# Patient Record
Sex: MALE | Race: BLACK OR AFRICAN AMERICAN | Employment: PART TIME | URBAN - METROPOLITAN AREA
[De-identification: names, ages, dates, MRNs, and addresses within clinical notes are randomized per-mention and may not be internally consistent; named-entity substitution may affect disease eponyms.]

---

## 2019-04-18 ENCOUNTER — APPOINTMENT (OUTPATIENT)
Dept: CT IMAGING | Age: 64
End: 2019-04-18
Attending: PHYSICIAN ASSISTANT
Payer: COMMERCIAL

## 2019-04-18 ENCOUNTER — HOSPITAL ENCOUNTER (EMERGENCY)
Age: 64
Discharge: HOME | End: 2019-04-18
Attending: EMERGENCY MEDICINE
Payer: COMMERCIAL

## 2019-04-18 VITALS
SYSTOLIC BLOOD PRESSURE: 132 MMHG | WEIGHT: 120.3 LBS | OXYGEN SATURATION: 99 % | TEMPERATURE: 99.3 F | HEART RATE: 78 BPM | DIASTOLIC BLOOD PRESSURE: 71 MMHG | RESPIRATION RATE: 19 BRPM

## 2019-04-18 DIAGNOSIS — F11.93 METHADONE WITHDRAWAL (HCC): Primary | ICD-10-CM

## 2019-04-18 LAB
ALBUMIN SERPL-MCNC: 3.8 G/DL (ref 3.5–5)
ALBUMIN/GLOB SERPL: 0.8 {RATIO} (ref 1.1–2.2)
ALP SERPL-CCNC: 99 U/L (ref 45–117)
ALT SERPL-CCNC: 41 U/L (ref 12–78)
ANION GAP SERPL CALC-SCNC: 11 MMOL/L (ref 5–15)
AST SERPL-CCNC: 32 U/L (ref 15–37)
ATRIAL RATE: 63 BPM
BASOPHILS # BLD: 0 K/UL (ref 0–0.1)
BASOPHILS NFR BLD: 0 % (ref 0–1)
BILIRUB SERPL-MCNC: 0.5 MG/DL (ref 0.2–1)
BUN SERPL-MCNC: 19 MG/DL (ref 6–20)
BUN/CREAT SERPL: 19 (ref 12–20)
CALCIUM SERPL-MCNC: 9.2 MG/DL (ref 8.5–10.1)
CALCULATED P AXIS, ECG09: 75 DEGREES
CALCULATED R AXIS, ECG10: 6 DEGREES
CALCULATED T AXIS, ECG11: -12 DEGREES
CHLORIDE SERPL-SCNC: 103 MMOL/L (ref 97–108)
CK SERPL-CCNC: 204 U/L (ref 39–308)
CO2 SERPL-SCNC: 28 MMOL/L (ref 21–32)
CREAT SERPL-MCNC: 0.98 MG/DL (ref 0.7–1.3)
DIAGNOSIS, 93000: NORMAL
DIFFERENTIAL METHOD BLD: NORMAL
EOSINOPHIL # BLD: 0 K/UL (ref 0–0.4)
EOSINOPHIL NFR BLD: 0 % (ref 0–7)
ERYTHROCYTE [DISTWIDTH] IN BLOOD BY AUTOMATED COUNT: 13.3 % (ref 11.5–14.5)
FLUAV AG NPH QL IA: NEGATIVE
FLUBV AG NOSE QL IA: NEGATIVE
GLOBULIN SER CALC-MCNC: 4.5 G/DL (ref 2–4)
GLUCOSE SERPL-MCNC: 140 MG/DL (ref 65–100)
HCT VFR BLD AUTO: 43.6 % (ref 36.6–50.3)
HGB BLD-MCNC: 14.6 G/DL (ref 12.1–17)
IMM GRANULOCYTES # BLD AUTO: 0 K/UL (ref 0–0.04)
IMM GRANULOCYTES NFR BLD AUTO: 0 % (ref 0–0.5)
LIPASE SERPL-CCNC: 73 U/L (ref 73–393)
LYMPHOCYTES # BLD: 1.6 K/UL (ref 0.8–3.5)
LYMPHOCYTES NFR BLD: 21 % (ref 12–49)
MCH RBC QN AUTO: 30.2 PG (ref 26–34)
MCHC RBC AUTO-ENTMCNC: 33.5 G/DL (ref 30–36.5)
MCV RBC AUTO: 90.3 FL (ref 80–99)
MONOCYTES # BLD: 0.9 K/UL (ref 0–1)
MONOCYTES NFR BLD: 11 % (ref 5–13)
NEUTS SEG # BLD: 5.2 K/UL (ref 1.8–8)
NEUTS SEG NFR BLD: 68 % (ref 32–75)
NRBC # BLD: 0 K/UL (ref 0–0.01)
NRBC BLD-RTO: 0 PER 100 WBC
P-R INTERVAL, ECG05: 152 MS
PLATELET # BLD AUTO: 193 K/UL (ref 150–400)
PMV BLD AUTO: 10.9 FL (ref 8.9–12.9)
POTASSIUM SERPL-SCNC: 3.2 MMOL/L (ref 3.5–5.1)
PROT SERPL-MCNC: 8.3 G/DL (ref 6.4–8.2)
Q-T INTERVAL, ECG07: 486 MS
QRS DURATION, ECG06: 82 MS
QTC CALCULATION (BEZET), ECG08: 497 MS
RBC # BLD AUTO: 4.83 M/UL (ref 4.1–5.7)
SODIUM SERPL-SCNC: 142 MMOL/L (ref 136–145)
TROPONIN I SERPL-MCNC: <0.05 NG/ML
VENTRICULAR RATE, ECG03: 63 BPM
WBC # BLD AUTO: 7.7 K/UL (ref 4.1–11.1)

## 2019-04-18 PROCEDURE — 83690 ASSAY OF LIPASE: CPT

## 2019-04-18 PROCEDURE — 85025 COMPLETE CBC W/AUTO DIFF WBC: CPT

## 2019-04-18 PROCEDURE — 74011250636 HC RX REV CODE- 250/636: Performed by: PHYSICIAN ASSISTANT

## 2019-04-18 PROCEDURE — 93005 ELECTROCARDIOGRAM TRACING: CPT

## 2019-04-18 PROCEDURE — 80053 COMPREHEN METABOLIC PANEL: CPT

## 2019-04-18 PROCEDURE — 96374 THER/PROPH/DIAG INJ IV PUSH: CPT

## 2019-04-18 PROCEDURE — 36415 COLL VENOUS BLD VENIPUNCTURE: CPT

## 2019-04-18 PROCEDURE — 74011250637 HC RX REV CODE- 250/637: Performed by: PHYSICIAN ASSISTANT

## 2019-04-18 PROCEDURE — 96375 TX/PRO/DX INJ NEW DRUG ADDON: CPT

## 2019-04-18 PROCEDURE — 82550 ASSAY OF CK (CPK): CPT

## 2019-04-18 PROCEDURE — 74011636320 HC RX REV CODE- 636/320: Performed by: EMERGENCY MEDICINE

## 2019-04-18 PROCEDURE — 84484 ASSAY OF TROPONIN QUANT: CPT

## 2019-04-18 PROCEDURE — 96361 HYDRATE IV INFUSION ADD-ON: CPT

## 2019-04-18 PROCEDURE — 71275 CT ANGIOGRAPHY CHEST: CPT

## 2019-04-18 PROCEDURE — 87804 INFLUENZA ASSAY W/OPTIC: CPT

## 2019-04-18 PROCEDURE — 99285 EMERGENCY DEPT VISIT HI MDM: CPT

## 2019-04-18 RX ORDER — ONDANSETRON 2 MG/ML
4 INJECTION INTRAMUSCULAR; INTRAVENOUS
Status: COMPLETED | OUTPATIENT
Start: 2019-04-18 | End: 2019-04-18

## 2019-04-18 RX ORDER — SODIUM CHLORIDE 0.9 % (FLUSH) 0.9 %
5-10 SYRINGE (ML) INJECTION
Status: COMPLETED | OUTPATIENT
Start: 2019-04-18 | End: 2019-04-18

## 2019-04-18 RX ORDER — POTASSIUM CHLORIDE 1.5 G/1.77G
20 POWDER, FOR SOLUTION ORAL
Status: COMPLETED | OUTPATIENT
Start: 2019-04-18 | End: 2019-04-18

## 2019-04-18 RX ORDER — FAMOTIDINE 10 MG/ML
20 INJECTION INTRAVENOUS
Status: COMPLETED | OUTPATIENT
Start: 2019-04-18 | End: 2019-04-18

## 2019-04-18 RX ORDER — PROMETHAZINE HYDROCHLORIDE 25 MG/1
25 TABLET ORAL
Status: COMPLETED | OUTPATIENT
Start: 2019-04-18 | End: 2019-04-18

## 2019-04-18 RX ORDER — ONDANSETRON 4 MG/1
4 TABLET, ORALLY DISINTEGRATING ORAL
Qty: 8 TAB | Refills: 0 | Status: SHIPPED | OUTPATIENT
Start: 2019-04-18

## 2019-04-18 RX ORDER — DIAZEPAM 5 MG/1
5 TABLET ORAL
Status: COMPLETED | OUTPATIENT
Start: 2019-04-18 | End: 2019-04-18

## 2019-04-18 RX ORDER — SODIUM CHLORIDE 0.9 % (FLUSH) 0.9 %
5-40 SYRINGE (ML) INJECTION EVERY 8 HOURS
Status: DISCONTINUED | OUTPATIENT
Start: 2019-04-18 | End: 2019-04-18 | Stop reason: HOSPADM

## 2019-04-18 RX ORDER — SODIUM CHLORIDE 0.9 % (FLUSH) 0.9 %
5-40 SYRINGE (ML) INJECTION AS NEEDED
Status: DISCONTINUED | OUTPATIENT
Start: 2019-04-18 | End: 2019-04-18 | Stop reason: HOSPADM

## 2019-04-18 RX ADMIN — SODIUM CHLORIDE 1000 ML: 900 INJECTION, SOLUTION INTRAVENOUS at 12:34

## 2019-04-18 RX ADMIN — DIAZEPAM 5 MG: 5 TABLET ORAL at 16:12

## 2019-04-18 RX ADMIN — ONDANSETRON HYDROCHLORIDE 4 MG: 2 SOLUTION INTRAMUSCULAR; INTRAVENOUS at 12:38

## 2019-04-18 RX ADMIN — Medication 10 ML: at 14:44

## 2019-04-18 RX ADMIN — PROMETHAZINE HYDROCHLORIDE 25 MG: 25 TABLET ORAL at 16:12

## 2019-04-18 RX ADMIN — IOPAMIDOL 100 ML: 755 INJECTION, SOLUTION INTRAVENOUS at 14:44

## 2019-04-18 RX ADMIN — FAMOTIDINE 20 MG: 10 INJECTION INTRAVENOUS at 12:38

## 2019-04-18 RX ADMIN — POTASSIUM CHLORIDE 20 MEQ: 1.5 POWDER, FOR SOLUTION ORAL at 16:13

## 2019-04-18 NOTE — ED NOTES
Emergency Department Nursing Plan of Care The Nursing Plan of Care is developed from the Nursing assessment and Emergency Department Attending provider initial evaluation. The plan of care may be reviewed in the ED Provider note. The Plan of Care was developed with the following considerations:  
Patient / Family readiness to learn indicated by:verbalized understanding Persons(s) to be included in education: patient Barriers to Learning/Limitations:No 
 
Signed Jamar Velasquez RN   
4/18/2019   12:32 PM

## 2019-04-18 NOTE — ED TRIAGE NOTES
Patient presents via EMS with primary c/o weakness associated with missing several doses of methadone. Patient reports generalized body aches, nausea, hot/cold chills. PIV established en route. Patient received 500 ml NS, 4 mg Zofran en route. Patient pleasant but anxious at this time.

## 2019-04-18 NOTE — PROGRESS NOTES
Spiritual Care Partner Volunteer visited patient in [place] [address] at [facility] on 4/18/19. Documented by: 
Clifton Sousa

## 2019-04-18 NOTE — ED NOTES
Rand has been taking methadone for 3 years but has not been able to get dosed for 2-3 days. Has been having nausea with vomiting and small amount of diarrhea over last 2 days. Rand was offered heroin to ease symptoms but turned it down. Is very pleasant and cooperative.

## 2019-04-18 NOTE — DISCHARGE INSTRUCTIONS
Patient Education        Learning About Medication-Assisted Treatment for Opioid Use Disorder  What is opioid use disorder? Opioid use disorder means using opioid drugs without a prescription or using them differently from what your doctor prescribed. Opioids are strong pain medicines. Examples include:  · Hydrocodone. · Oxycodone. · Fentanyl. · Morphine. Heroin is an example of an illegal opioid. Opioid use can quickly become a habit. It can change your brain and how it works. If you keep using the drug, you may get strong cravings for it. You may need more and more of the drug to get the same effect. You may become addicted. What is medication-assisted treatment? Medication-assisted treatment, or MAT, involves taking a substitute drug in place of the opioid you were using. The substitute drugs used in MAT include:  · Buprenorphine, such as Subutex. This drug works by targeting the same places in the brain that opioids do. · Methadone, such as Dolophine. It stops you from getting the quick \"high\" of opioids. · Naltrexone, such as ReVia. It prevents the feelings of pleasure you get from taking the opioid. This drug is only given after you have gone through withdrawal.  MAT can work in different ways, depending on which medicine you take. Sometimes it can help with withdrawal symptoms. Other times it helps you manage cravings. When you use MAT, you no longer think all the time about how to get or use drugs. You are more able to focus on getting better. You can work on how you will stay away from using drugs. What is it like to take this medicine? Because these medicines are very powerful, doctors follow a strict set of rules for MAT. This is to make sure the medicines are used safely. Your doctor may have you sign a written agreement to take your medicine exactly as he or she tells you to. Yamileth Whipple also agree to be careful with it and not share it with others.  If you don't follow the agreement, your doctor may not be willing to keep treating you. Depending on the medicine being used, you will probably take a pill every day, at least at first. With some medicines, you may be able to take them less often after a while. You may have to go to the doctor's office or a clinic to get your medicine each time. Your dose may need to be adjusted up or down at first.  Some people have side effects, but they're usually minor. You might take the new drug for months, years, or even for life. Along with MAT, counseling and education are also an important part of treatment for opioid use disorder. Follow-up care is a key part of your treatment and safety. Be sure to make and go to all appointments, and call your doctor if you are having problems. It's also a good idea to know your test results and keep a list of the medicines you take. Where can you learn more? Go to http://vinita-amelia.info/. Enter U413 in the search box to learn more about \"Learning About Medication-Assisted Treatment for Opioid Use Disorder. \"  Current as of: May 7, 2018  Content Version: 11.9  © 0457-2029 [employer]. Care instructions adapted under license by [employer] (which disclaims liability or warranty for this information). If you have questions about a medical condition or this instruction, always ask your healthcare professional. [address] any warranty or liability for your use of this information.

## 2019-04-18 NOTE — ED NOTES
Pt given printed discharge instructions and 1 script(s). Pt verbalized understanding of instructions and script(s). Pt verbalized importance of following up with [facility] and [address]. Pt alert and oriented, in no acute distress, ambulatory with self.

## 2019-04-18 NOTE — PROGRESS NOTES
CM reviewed chart. Pt verified his demographics. Pt explained he works part time at [employer] He is clean from drugs for th last 3 years. Is currently experiencing sickness because he has not been able to get his Methadone [facility] on [id number] [id number]. Pt said he ran out of money. The treatment cost $16 a day. Usually pt said he has the funds, but he doesn't get paid until next Friday. CM provided [facility] for substance abuse as well as [address] to call. Per [employer], occasionally [address] will work out a payment plan with pt's who need financial assistance. Pt lives in his sisters home, she helps pt with financial support for treatment when he can. Pt gave verbal understanding of [facility]. CM called and left Ms. Nixon Weiner with [facility] for a call back. CM will pass pt's information on to help apply for medicaid. Care Management Interventions PCP Verified by CM: No 
Transition of Care Consult (CM Consult): Discharge Planning Current Support Network: Relative's Home Confirm Follow Up Transport: Self Discharge Location Discharge Placement: Home GFRANQ, MSW  
[phone]

## 2019-04-18 NOTE — ED PROVIDER NOTES
EMERGENCY DEPARTMENT HISTORY AND PHYSICAL EXAM 
 
 
Date: 4/18/2019 Patient Name: Nigel Driver History of Presenting Illness Chief Complaint Patient presents with  Fatigue  Vomiting History Provided By: Patient HPI: Nigel Driver, 61 y.o. 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 Patient endorses he did miss his last to methadone doses because he was not feeling well. No medications or modifying factors. Denies fever, chills, headache, lightheadedness, dizziness, syncope, seizure, back pain, hemoptysis, wheezing, cough, leg swelling, palpitations, constipation, melena, hematochezia, hematemesis, urinary symptoms. Patient endorses mild intermittent shortness of breath. None presently. There are no other complaints, changes, or physical findings at this time. PCP: None No current facility-administered medications on file prior to encounter. No current outpatient medications on file prior to encounter. Past History Past Medical History: 
Past Medical History:  
Diagnosis Date  Ill-defined condition   
 substance abuse Past Surgical History: 
Past Surgical History:  
Procedure Laterality Date  HX ORTHOPAEDIC  1990's  
 cervical fusion Family History: 
History reviewed. No pertinent family history. Social History: 
Social History Tobacco Use  Smoking status: Current Every Day Smoker  Smokeless tobacco: Never Used Substance Use Topics  Alcohol use: Not Currently  Drug use: Not Currently Types: Heroin Allergies: 
No Known Allergies Review of Systems Review of Systems Constitutional: Positive for appetite change, diaphoresis and fatigue. Negative for activity change, chills, fever and unexpected weight change. HENT: Negative for congestion, facial swelling, mouth sores, postnasal drip, rhinorrhea, sinus pain, sore throat, tinnitus, trouble swallowing and voice change. Eyes: Negative for photophobia, pain and visual disturbance. Respiratory: Positive for shortness of breath. Negative for cough, chest tightness and wheezing. Cardiovascular: Positive for chest pain. Negative for palpitations and leg swelling. Gastrointestinal: Positive for abdominal pain, diarrhea, nausea and vomiting. Negative for constipation. Genitourinary: Negative for decreased urine volume and hematuria. Musculoskeletal: Positive for myalgias. Negative for arthralgias, back pain, gait problem, joint swelling, neck pain and neck stiffness. Skin: Negative for rash and wound. Neurological: Negative for dizziness, seizures, syncope, weakness, light-headedness, numbness and headaches. Psychiatric/Behavioral: Negative. Physical Exam  
Physical Exam  
Constitutional: He is oriented to person, place, and time. He appears well-developed and well-nourished. He appears ill. No distress. HENT:  
Head: Normocephalic and atraumatic. Right Ear: Hearing and external ear normal.  
Left Ear: Hearing and external ear normal.  
Nose: Nose normal. No mucosal edema or rhinorrhea. Mouth/Throat: Uvula is midline, oropharynx is clear and moist and mucous membranes are normal. Mucous membranes are not dry. No trismus in the jaw. No oropharyngeal exudate, posterior oropharyngeal edema, posterior oropharyngeal erythema or tonsillar abscesses. Eyes: Pupils are equal, round, and reactive to light. Conjunctivae and EOM are normal.  
Neck: Normal range of motion, full passive range of motion without pain and phonation normal.  
Cardiovascular: Normal rate, regular rhythm, normal heart sounds and intact distal pulses.   
Pulmonary/Chest: Effort normal and breath sounds normal. No accessory muscle usage. No respiratory distress. He has no decreased breath sounds. He has no wheezes. He has no rhonchi. He has no rales. Abdominal: Soft. Bowel sounds are normal. He exhibits pulsatile midline mass. There is generalized tenderness. There is no rigidity, no rebound, no guarding, no CVA tenderness, no tenderness at McBurney's point and negative Quinn's sign. No hernia. Musculoskeletal: Normal range of motion. Neurological: He is alert and oriented to person, place, and time. He has normal strength. He is not disoriented. No cranial nerve deficit or sensory deficit. GCS eye subscore is 4. GCS verbal subscore is 5. GCS motor subscore is 6. Skin: Skin is warm and intact. No rash noted. He is diaphoretic. No pallor. Psychiatric: He has a normal mood and affect. His speech is normal and behavior is normal. Judgment and thought content normal. Cognition and memory are normal.  
Nursing note and vitals reviewed. Diagnostic Study Results Labs - Recent Results (from the past 12 hour(s)) EKG, 12 LEAD, INITIAL Collection Time: 04/18/19 12:19 PM  
Result Value Ref Range Ventricular Rate 63 BPM  
 Atrial Rate 63 BPM  
 P-R Interval 152 ms QRS Duration 82 ms Q-T Interval 486 ms QTC Calculation (Bezet) 497 ms Calculated P Axis 75 degrees Calculated R Axis 6 degrees Calculated T Axis -12 degrees Diagnosis Normal sinus rhythm Nonspecific intraventricular conduction delay Minor nonspecific st&t changes No previous ECGs available Confirmed by Cheko Lerner ([id number]) on 4/18/2019 12:35:23 PM 
  
INFLUENZA A & B AG (RAPID TEST) Collection Time: 04/18/19 12:32 PM  
Result Value Ref Range Influenza A Antigen NEGATIVE  NEG Influenza B Antigen NEGATIVE  NEG    
TROPONIN I Collection Time: 04/18/19 12:32 PM  
Result Value Ref Range Troponin-I, Qt. <0.05 <0.05 ng/mL CK W/ REFLX CKMB Collection Time: 04/18/19 12:32 PM  
Result Value Ref Range  39 - 308 U/L  
CBC WITH AUTOMATED DIFF Collection Time: 04/18/19 12:32 PM  
Result Value Ref Range WBC 7.7 4.1 - 11.1 K/uL  
 RBC 4.83 4.10 - 5.70 M/uL  
 HGB 14.6 12.1 - 17.0 g/dL HCT 43.6 36.6 - 50.3 % MCV 90.3 80.0 - 99.0 FL  
 MCH 30.2 26.0 - 34.0 PG  
 MCHC 33.5 30.0 - 36.5 g/dL  
 RDW 13.3 11.5 - 14.5 % PLATELET 855 342 - 000 K/uL MPV 10.9 8.9 - 12.9 FL  
 NRBC 0.0 0  WBC ABSOLUTE NRBC 0.00 0.00 - 0.01 K/uL NEUTROPHILS 68 32 - 75 % LYMPHOCYTES 21 12 - 49 % MONOCYTES 11 5 - 13 % EOSINOPHILS 0 0 - 7 % BASOPHILS 0 0 - 1 % IMMATURE GRANULOCYTES 0 0.0 - 0.5 % ABS. NEUTROPHILS 5.2 1.8 - 8.0 K/UL  
 ABS. LYMPHOCYTES 1.6 0.8 - 3.5 K/UL  
 ABS. MONOCYTES 0.9 0.0 - 1.0 K/UL  
 ABS. EOSINOPHILS 0.0 0.0 - 0.4 K/UL  
 ABS. BASOPHILS 0.0 0.0 - 0.1 K/UL  
 ABS. IMM. GRANS. 0.0 0.00 - 0.04 K/UL  
 DF AUTOMATED METABOLIC PANEL, COMPREHENSIVE Collection Time: 04/18/19 12:32 PM  
Result Value Ref Range Sodium 142 136 - 145 mmol/L Potassium 3.2 (L) 3.5 - 5.1 mmol/L Chloride 103 97 - 108 mmol/L  
 CO2 28 21 - 32 mmol/L Anion gap 11 5 - 15 mmol/L Glucose 140 (H) 65 - 100 mg/dL BUN 19 6 - 20 MG/DL Creatinine 0.98 0.70 - 1.30 MG/DL  
 BUN/Creatinine ratio 19 12 - 20 GFR est AA >60 >60 ml/min/1.73m2 GFR est non-AA >60 >60 ml/min/1.73m2 Calcium 9.2 8.5 - 10.1 MG/DL Bilirubin, total 0.5 0.2 - 1.0 MG/DL  
 ALT (SGPT) 41 12 - 78 U/L  
 AST (SGOT) 32 15 - 37 U/L Alk. phosphatase 99 45 - 117 U/L Protein, total 8.3 (H) 6.4 - 8.2 g/dL Albumin 3.8 3.5 - 5.0 g/dL Globulin 4.5 (H) 2.0 - 4.0 g/dL A-G Ratio 0.8 (L) 1.1 - 2.2 LIPASE Collection Time: 04/18/19 12:32 PM  
Result Value Ref Range Lipase 73 73 - 393 U/L Radiologic Studies -  
CTA CHEST ABD PELV W CONT Final Result IMPRESSION:  
1. CHEST: No thoracic aortic aneurysm. Incidental findings. 2..ABDOMEN: No abdominal aortic aneurysm or branch vessel abnormality. Incidental findings. 3. PELVIS: No acute abnormality. Appendicolith versus residual luminal oral  
contrast. Prostatic enlargement. CT Results  (Last 48 hours) 04/18/19 1444  CTA CHEST ABD PELV W CONT Final result Impression:  IMPRESSION:  
1. CHEST: No thoracic aortic aneurysm. Incidental findings. 2..ABDOMEN: No abdominal aortic aneurysm or branch vessel abnormality. Incidental findings. 3. PELVIS: No acute abnormality. Appendicolith versus residual luminal oral  
contrast. Prostatic enlargement. Narrative:  INDICATION:  Aneurysm, chest and abdominal aorta, generalized body pain. EXAM: .  CT ANGIOGRAPHY OF THE CHEST, ABDOMEN AND PELVIS WAS PERFORMED WITH POSTPROCESSING, 3D 3D RECONSTRUCTION, AND MAXIMUM INTENSITY PROJECTION. . . . POST-PROCESSING WAS PERFORMED. Sequential axial images of the  chest, abdomen  
and pelvis were performed following intravenous contrast. Soft tissue, lung and  
bone windows were examined. Post-processing was performed for coronal and  
sagittal reformatting. CT dose reduction was achieved through use of a  
standardized protocol tailored for this examination and automatic exposure  
control for dose modulation. COMPARISON:  None. CONTRAST:  95 cc [id number] FINDINGS:  
   
CHEST: The thoracic aorta is normal, as are the great vessel origins. The  
pulmonary artery is normal with no pulmonary embolus. There is no  
lymphadenopathy or pleural effusion. Calcified granuloma incidentally noted in  
the lingula. Tiny nodular pleural parenchymal thickening in the right lung apex  
of doubtful clinical significance. ABDOMEN: Liver and spleen parenchyma are homogeneous. The pancreas and adrenal  
glands are normal. The kidneys excrete contrast symmetrically bilaterally. Tiny  
renal cysts are suggested.  Bowel loops are nondilated and there is no ascites. The abdominal aorta is normal for age, as are mesenteric and renal arteries. PELVIS:  Additional evaluation of the pelvis reveals no abnormal mass or fluid  
collection. The urinary bladder is normally distended. The distal ureters are  
normal. The appendix contains a calcification in the distal lumen suggesting an  
appendicolith or old residual oral contrast.. Prostatic enlargement is  
moderately severe, impressing into the bladder base. CXR Results  (Last 48 hours) None Medical Decision Making I am the first provider for this patient. I reviewed the vital signs, available nursing notes, past medical history, past surgical history, family history and social history. Vital Signs-Reviewed the patient's vital signs. Patient Vitals for the past 12 hrs: 
 Temp Pulse Resp BP SpO2  
04/18/19 1240    132/71 99 % 04/18/19 1225     99 % 04/18/19 1200    124/67 98 % 04/18/19 1147 99.3 °F (37.4 °C) 78 19 127/69 99 % Pulse Oximetry Analysis - 99% on RA 
 
EKG interpretation: (Preliminary) Rhythm: normal sinus rhythm and nonspecific intraventricular conduction delay; and regular . Rate (approx.): 63; Axis: normal; HI interval: normal; QRS interval: normal ; ST/T wave: non-specific changes; Other findings: borderline ekg. Records Reviewed: Nursing Notes, Old Medical Records and No [facility] records on file. Provider Notes (Medical Decision Making):  
79-year-old male presents with acute fatigue, nausea, vomiting, abdominal pain, chest pain, and mild diarrhea X 2 days secondary to missing his dose of methadone x 2 days. Plan to treat patient symptoms in ED. suspect methadone withdrawal symptoms. Differential includes flu, URI, ACS, PNA, effusion, Gastroenteritis, SBO, appendicitis, colitis, IBD, diverticulitis, mesenteric ischemia, AAA or descending dissection, ACS, ureteral  Stone,  pathology. ED Course: Initial assessment performed. The patients presenting problems have been discussed, and they are in agreement with the care plan formulated and outlined with them. I have encouraged them to ask questions as they arise throughout their visit. ED Course as of Apr 18 1745 Thu Apr 18, 2019  
1224 Pt is 99% SpO2 on RA.  
 [SM] 1348 Pt resting comfortably in room in NAD. No new symptoms or complaints at this time. Available labs/ results reviewed with pt. 
  
 [SM] [facility]  is going to speak with patient had methadone resources. Patient endorses he does not have money or insurance to get the methadone and that is the barrier today. [SM] ED Course User Index [SM] Pierce Anaya PA-C Critical Care Time:  
0 Disposition: 
 
I have discussed with patient their diagnosis, treatment, and follow up plan. The patient agrees to follow up as outlined in discharge paperwork and also to return to the ED with any worsening. Jeanie Han PA-C 
 
PLAN: 
1. Discharge Medication List as of 4/18/2019  4:06 PM  
  
START taking these medications Details  
ondansetron (ZOFRAN ODT) 4 mg disintegrating tablet Take 1 Tab by mouth every eight (8) hours as needed for Nausea., Normal, Disp-8 Tab, R-0  
  
  
 
2. Follow-up Information Follow up With Specialties Details Why Contact Info [facility] [place]  Schedule an appointment as soon as possible for a visit in 2 days As needed [address] [facility] [id number] [phone] [facility]  Schedule an appointment as soon as possible for a visit in 1 day As needed [address] [facility] [id number] Return to ED if worse Diagnosis Clinical Impression: 1. Methadone withdrawal ([facility] [address] Attestations:
